# Patient Record
Sex: FEMALE | URBAN - METROPOLITAN AREA
[De-identification: names, ages, dates, MRNs, and addresses within clinical notes are randomized per-mention and may not be internally consistent; named-entity substitution may affect disease eponyms.]

---

## 2023-02-03 ENCOUNTER — OFFICE VISIT (OUTPATIENT)
Dept: OBGYN CLINIC | Facility: CLINIC | Age: 33
End: 2023-02-03

## 2023-02-03 DIAGNOSIS — Z71.89 PRENATAL CONSULT: Primary | ICD-10-CM

## 2023-02-04 NOTE — PROGRESS NOTES
Gume Dey presents for meet and greet. She is a  at 32.4wks who just recently moved to the [facility] from [facility]. She has received prenatal care in [facility] and comes with records, though they are in [facility]. She denies medical conditions or diagnoses. First pregnancy and birth was uncomplicated. First labor was IOL due to postdates and hopes to avoid with this pregnancy. Desires CNM care. Midwifery care & philosophy discussed. Practice guidelines discussed. Pt is appropriate for nurse education visit.

## 2023-03-27 ENCOUNTER — HOSPITAL ENCOUNTER (OUTPATIENT)
Dept: ULTRASOUND IMAGING | Age: 33
Discharge: HOME | End: 2023-03-27
Payer: COMMERCIAL

## 2023-03-27 DIAGNOSIS — Z3A.42 42 WEEKS GESTATION OF PREGNANCY: ICD-10-CM

## 2023-03-27 DIAGNOSIS — O48.0 42 WEEKS GESTATION OF PREGNANCY: ICD-10-CM

## 2023-03-27 PROCEDURE — 76819 FETAL BIOPHYS PROFIL W/O NST: CPT | Performed by: ADVANCED PRACTICE MIDWIFE

## 2023-03-27 PROCEDURE — 76819 FETAL BIOPHYS PROFIL W/O NST: CPT
